# Patient Record
Sex: MALE | Race: WHITE | Employment: PART TIME | URBAN - METROPOLITAN AREA
[De-identification: names, ages, dates, MRNs, and addresses within clinical notes are randomized per-mention and may not be internally consistent; named-entity substitution may affect disease eponyms.]

---

## 2017-02-10 ENCOUNTER — OFFICE VISIT (OUTPATIENT)
Dept: INTERNAL MEDICINE CLINIC | Facility: CLINIC | Age: 42
End: 2017-02-10

## 2017-02-10 VITALS
SYSTOLIC BLOOD PRESSURE: 117 MMHG | HEIGHT: 70 IN | HEART RATE: 73 BPM | RESPIRATION RATE: 12 BRPM | WEIGHT: 224 LBS | TEMPERATURE: 97 F | DIASTOLIC BLOOD PRESSURE: 72 MMHG | BODY MASS INDEX: 32.07 KG/M2

## 2017-02-10 DIAGNOSIS — J06.9 VIRAL UPPER RESPIRATORY TRACT INFECTION: ICD-10-CM

## 2017-02-10 DIAGNOSIS — K11.8 MASS OF SALIVARY GLAND: Primary | ICD-10-CM

## 2017-02-10 PROCEDURE — 99202 OFFICE O/P NEW SF 15 MIN: CPT | Performed by: INTERNAL MEDICINE

## 2017-02-10 PROCEDURE — 99212 OFFICE O/P EST SF 10 MIN: CPT | Performed by: INTERNAL MEDICINE

## 2017-02-10 RX ORDER — AMOXICILLIN 500 MG/1
1 CAPSULE ORAL 3 TIMES DAILY
Refills: 0 | COMMUNITY
Start: 2017-02-03 | End: 2017-02-17 | Stop reason: ALTCHOICE

## 2017-02-10 NOTE — PROGRESS NOTES
HPI:    Patient ID: Oscar Wu is a 46year old male. Mass  This is a new problem. The current episode started more than 1 month ago. The problem occurs constantly. The problem has been gradually worsening. Associated symptoms include congestion.  Pert Musculoskeletal: He exhibits no edema. Lymphadenopathy:        Head (right side): No submental, no submandibular, no preauricular, no posterior auricular and no occipital adenopathy present.         Head (left side): No submental, no submandibular, no p

## 2017-02-17 ENCOUNTER — OFFICE VISIT (OUTPATIENT)
Dept: OTOLARYNGOLOGY | Facility: CLINIC | Age: 42
End: 2017-02-17

## 2017-02-17 VITALS
DIASTOLIC BLOOD PRESSURE: 72 MMHG | SYSTOLIC BLOOD PRESSURE: 114 MMHG | HEIGHT: 70 IN | HEART RATE: 72 BPM | BODY MASS INDEX: 30.78 KG/M2 | WEIGHT: 215 LBS

## 2017-02-17 DIAGNOSIS — R22.1 NECK MASS: Primary | ICD-10-CM

## 2017-02-17 PROCEDURE — 99212 OFFICE O/P EST SF 10 MIN: CPT | Performed by: OTOLARYNGOLOGY

## 2017-02-17 PROCEDURE — 99243 OFF/OP CNSLTJ NEW/EST LOW 30: CPT | Performed by: OTOLARYNGOLOGY

## 2017-02-17 RX ORDER — ARIPIPRAZOLE 10 MG/1
10 TABLET ORAL DAILY
COMMUNITY

## 2017-02-17 NOTE — PROGRESS NOTES
Mika Johnson is a 46year old male. Patient presents with:  Mass: right side of neck , per pt no pain or tendersness , pt prescribed amoxicillin by oral surgeon     HPI:   He has had a lump in the right side of his neck for the last 2 months. It seems to be Submental. Submandibular. Anterior cervical. Posterior cervical. Supraclavicular.    Eyes Normal Conjunctiva - Right: Normal, Left: Normal. Pupil - Right: Normal, Left: Normal.    Ears Normal Inspection - Right: Normal, Left: Normal. Canal - Left: Normal. T

## 2017-02-20 ENCOUNTER — TELEPHONE (OUTPATIENT)
Dept: OTOLARYNGOLOGY | Facility: CLINIC | Age: 42
End: 2017-02-20

## 2017-02-20 NOTE — TELEPHONE ENCOUNTER
Called pt's insurance [phone], spoke with Jami Bishop, case is in clinical review, tracking number [id number].

## 2017-02-21 NOTE — TELEPHONE ENCOUNTER
Gave his mom the auth# and then transferred her to central scheduling to schedule CT soft tissue neck

## 2017-02-21 NOTE — TELEPHONE ENCOUNTER
Left message for pt to call back to inform that he can schedule CT soft tissue of neck at this time, authorization number [id number], valid 2-20-17 thru 3-22-17 to be done at [facility]. It is the patient's responsibility to verify benefit

## 2017-02-25 ENCOUNTER — HOSPITAL ENCOUNTER (OUTPATIENT)
Dept: CT IMAGING | Facility: HOSPITAL | Age: 42
Discharge: HOME | End: 2017-02-25
Attending: OTOLARYNGOLOGY
Payer: COMMERCIAL

## 2017-02-25 DIAGNOSIS — R22.1 NECK MASS: ICD-10-CM

## 2017-02-25 PROCEDURE — 70491 CT SOFT TISSUE NECK W/DYE: CPT

## 2017-02-28 ENCOUNTER — TELEPHONE (OUTPATIENT)
Dept: OTOLARYNGOLOGY | Facility: CLINIC | Age: 42
End: 2017-02-28

## 2017-02-28 NOTE — TELEPHONE ENCOUNTER
Pt informed of results and recommendation to RTC to discuss treatment. Pt verbalized understanding; scheduled f/u appt w/ .

## 2017-03-06 ENCOUNTER — OFFICE VISIT (OUTPATIENT)
Dept: OTOLARYNGOLOGY | Facility: CLINIC | Age: 42
End: 2017-03-06

## 2017-03-06 VITALS
WEIGHT: 215 LBS | HEIGHT: 70 IN | TEMPERATURE: 98 F | BODY MASS INDEX: 30.78 KG/M2 | SYSTOLIC BLOOD PRESSURE: 121 MMHG | DIASTOLIC BLOOD PRESSURE: 77 MMHG

## 2017-03-06 DIAGNOSIS — R22.1 NECK MASS: Primary | ICD-10-CM

## 2017-03-06 PROCEDURE — 99212 OFFICE O/P EST SF 10 MIN: CPT | Performed by: OTOLARYNGOLOGY

## 2017-03-06 PROCEDURE — 99213 OFFICE O/P EST LOW 20 MIN: CPT | Performed by: OTOLARYNGOLOGY

## 2017-03-06 NOTE — PROGRESS NOTES
Jacobo Watkins is a 46year old male. Patient presents with:  Results: CT soft tissue of neck done on 2-25    HPI:   Pt is in follow up of a CT of his neck. I reviewed the CT myself and the findings with him.  CT shows a large cystic mass involving the right Lymph Detail Normal Submental. Submandibular. Anterior cervical. Posterior cervical. Supraclavicular.    Eyes Normal Conjunctiva - Right: Normal, Left: Normal. Pupil - Right: Normal, Left: Normal.    Ears Normal Inspection - Right: Normal, Left: Normal. C

## 2017-03-07 ENCOUNTER — TELEPHONE (OUTPATIENT)
Dept: OTOLARYNGOLOGY | Facility: CLINIC | Age: 42
End: 2017-03-07

## 2017-03-09 ENCOUNTER — TELEPHONE (OUTPATIENT)
Dept: OTOLARYNGOLOGY | Facility: CLINIC | Age: 42
End: 2017-03-09

## 2017-03-09 NOTE — TELEPHONE ENCOUNTER
Pts mother requesting VIET form to be mailed to home address, address on [facility] confirmed.

## 2017-03-09 NOTE — TELEPHONE ENCOUNTER
Pt contacted, Scheduled surgery with mother with pt permission, Scheduled surgery for 4/3/17 with Dr. César Solitario. Pre-post op instructions reviewed.

## 2017-03-28 ENCOUNTER — TELEPHONE (OUTPATIENT)
Dept: OTOLARYNGOLOGY | Facility: CLINIC | Age: 42
End: 2017-03-28

## 2017-03-28 NOTE — TELEPHONE ENCOUNTER
Pt scheduled for surgery 4/3/17 w/  (excision R neck mass). Pt's mom would like to know if pt may continue Sertraline and generic Abilify until then. Please advise.

## 2017-03-28 NOTE — TELEPHONE ENCOUNTER
pts Mom called. pt is to have surgery 4/3/17. She asked if Unknown Press can continue taking Sertraline and generic Abilify. Ok to leave message on voice mail. Thank you.

## 2017-04-03 ENCOUNTER — HOSPITAL ENCOUNTER (OUTPATIENT)
Facility: HOSPITAL | Age: 42
Setting detail: HOSPITAL OUTPATIENT SURGERY
Discharge: HOME | End: 2017-04-03
Attending: OTOLARYNGOLOGY | Admitting: OTOLARYNGOLOGY
Payer: COMMERCIAL

## 2017-04-03 ENCOUNTER — ANESTHESIA (OUTPATIENT)
Dept: SURGERY | Facility: HOSPITAL | Age: 42
End: 2017-04-03
Payer: COMMERCIAL

## 2017-04-03 ENCOUNTER — ANESTHESIA EVENT (OUTPATIENT)
Dept: SURGERY | Facility: HOSPITAL | Age: 42
End: 2017-04-03
Payer: COMMERCIAL

## 2017-04-03 ENCOUNTER — SURGERY (OUTPATIENT)
Age: 42
End: 2017-04-03

## 2017-04-03 VITALS
WEIGHT: 223 LBS | DIASTOLIC BLOOD PRESSURE: 68 MMHG | RESPIRATION RATE: 18 BRPM | HEART RATE: 89 BPM | TEMPERATURE: 98 F | HEIGHT: 70 IN | OXYGEN SATURATION: 95 % | SYSTOLIC BLOOD PRESSURE: 110 MMHG | BODY MASS INDEX: 31.92 KG/M2

## 2017-04-03 DIAGNOSIS — R22.1 NECK MASS: ICD-10-CM

## 2017-04-03 PROCEDURE — 88307 TISSUE EXAM BY PATHOLOGIST: CPT | Performed by: OTOLARYNGOLOGY

## 2017-04-03 PROCEDURE — 88304 TISSUE EXAM BY PATHOLOGIST: CPT | Performed by: OTOLARYNGOLOGY

## 2017-04-03 PROCEDURE — 88305 TISSUE EXAM BY PATHOLOGIST: CPT | Performed by: OTOLARYNGOLOGY

## 2017-04-03 PROCEDURE — 95867 NDL EMG CRANIAL NRV MUSC UNI: CPT | Performed by: OTOLARYNGOLOGY

## 2017-04-03 PROCEDURE — 0WB60ZZ EXCISION OF NECK, OPEN APPROACH: ICD-10-PCS | Performed by: OTOLARYNGOLOGY

## 2017-04-03 RX ORDER — NALOXONE HYDROCHLORIDE 0.4 MG/ML
80 INJECTION, SOLUTION INTRAMUSCULAR; INTRAVENOUS; SUBCUTANEOUS AS NEEDED
Status: DISCONTINUED | OUTPATIENT
Start: 2017-04-03 | End: 2017-04-03

## 2017-04-03 RX ORDER — DEXAMETHASONE SODIUM PHOSPHATE 4 MG/ML
VIAL (ML) INJECTION AS NEEDED
Status: DISCONTINUED | OUTPATIENT
Start: 2017-04-03 | End: 2017-04-03 | Stop reason: SURG

## 2017-04-03 RX ORDER — ONDANSETRON 2 MG/ML
4 INJECTION INTRAMUSCULAR; INTRAVENOUS ONCE AS NEEDED
Status: COMPLETED | OUTPATIENT
Start: 2017-04-03 | End: 2017-04-03

## 2017-04-03 RX ORDER — SUCCINYLCHOLINE CHLORIDE 20 MG/ML
INJECTION INTRAMUSCULAR; INTRAVENOUS AS NEEDED
Status: DISCONTINUED | OUTPATIENT
Start: 2017-04-03 | End: 2017-04-03 | Stop reason: SURG

## 2017-04-03 RX ORDER — HYDROMORPHONE HYDROCHLORIDE 1 MG/ML
0.2 INJECTION, SOLUTION INTRAMUSCULAR; INTRAVENOUS; SUBCUTANEOUS EVERY 5 MIN PRN
Status: DISCONTINUED | OUTPATIENT
Start: 2017-04-03 | End: 2017-04-03

## 2017-04-03 RX ORDER — ONDANSETRON 2 MG/ML
4 INJECTION INTRAMUSCULAR; INTRAVENOUS EVERY 6 HOURS PRN
Status: DISCONTINUED | OUTPATIENT
Start: 2017-04-03 | End: 2017-04-03

## 2017-04-03 RX ORDER — ONDANSETRON 4 MG/1
4 TABLET, ORALLY DISINTEGRATING ORAL EVERY 6 HOURS PRN
Status: DISCONTINUED | OUTPATIENT
Start: 2017-04-03 | End: 2017-04-03

## 2017-04-03 RX ORDER — HYDROCODONE BITARTRATE AND ACETAMINOPHEN 5; 325 MG/1; MG/1
1 TABLET ORAL EVERY 4 HOURS PRN
Status: DISCONTINUED | OUTPATIENT
Start: 2017-04-03 | End: 2017-04-03

## 2017-04-03 RX ORDER — LIDOCAINE HYDROCHLORIDE 40 MG/ML
SOLUTION TOPICAL AS NEEDED
Status: DISCONTINUED | OUTPATIENT
Start: 2017-04-03 | End: 2017-04-03 | Stop reason: SURG

## 2017-04-03 RX ORDER — HYDROMORPHONE HYDROCHLORIDE 1 MG/ML
0.4 INJECTION, SOLUTION INTRAMUSCULAR; INTRAVENOUS; SUBCUTANEOUS EVERY 5 MIN PRN
Status: DISCONTINUED | OUTPATIENT
Start: 2017-04-03 | End: 2017-04-03

## 2017-04-03 RX ORDER — MORPHINE SULFATE 4 MG/ML
4 INJECTION, SOLUTION INTRAMUSCULAR; INTRAVENOUS EVERY 10 MIN PRN
Status: DISCONTINUED | OUTPATIENT
Start: 2017-04-03 | End: 2017-04-03

## 2017-04-03 RX ORDER — CEPHALEXIN 500 MG/1
500 CAPSULE ORAL EVERY 8 HOURS
Qty: 15 CAPSULE | Refills: 0 | Status: SHIPPED | OUTPATIENT
Start: 2017-04-03

## 2017-04-03 RX ORDER — HYDROCODONE BITARTRATE AND ACETAMINOPHEN 5; 325 MG/1; MG/1
TABLET ORAL
Qty: 30 TABLET | Refills: 0 | Status: SHIPPED | OUTPATIENT
Start: 2017-04-03

## 2017-04-03 RX ORDER — FAMOTIDINE 20 MG/1
20 TABLET ORAL ONCE
Status: COMPLETED | OUTPATIENT
Start: 2017-04-03 | End: 2017-04-03

## 2017-04-03 RX ORDER — SODIUM CHLORIDE, SODIUM LACTATE, POTASSIUM CHLORIDE, CALCIUM CHLORIDE 600; 310; 30; 20 MG/100ML; MG/100ML; MG/100ML; MG/100ML
INJECTION, SOLUTION INTRAVENOUS CONTINUOUS
Status: DISCONTINUED | OUTPATIENT
Start: 2017-04-03 | End: 2017-04-03

## 2017-04-03 RX ORDER — METOCLOPRAMIDE 10 MG/1
10 TABLET ORAL ONCE
Status: DISCONTINUED | OUTPATIENT
Start: 2017-04-03 | End: 2017-04-03

## 2017-04-03 RX ORDER — MIDAZOLAM HYDROCHLORIDE 1 MG/ML
INJECTION INTRAMUSCULAR; INTRAVENOUS AS NEEDED
Status: DISCONTINUED | OUTPATIENT
Start: 2017-04-03 | End: 2017-04-03 | Stop reason: SURG

## 2017-04-03 RX ORDER — HYDROMORPHONE HYDROCHLORIDE 1 MG/ML
0.6 INJECTION, SOLUTION INTRAMUSCULAR; INTRAVENOUS; SUBCUTANEOUS EVERY 5 MIN PRN
Status: DISCONTINUED | OUTPATIENT
Start: 2017-04-03 | End: 2017-04-03

## 2017-04-03 RX ORDER — GLYCOPYRROLATE 0.2 MG/ML
INJECTION INTRAMUSCULAR; INTRAVENOUS AS NEEDED
Status: DISCONTINUED | OUTPATIENT
Start: 2017-04-03 | End: 2017-04-03 | Stop reason: SURG

## 2017-04-03 RX ORDER — EPHEDRINE SULFATE 50 MG/ML
INJECTION, SOLUTION INTRAVENOUS AS NEEDED
Status: DISCONTINUED | OUTPATIENT
Start: 2017-04-03 | End: 2017-04-03 | Stop reason: SURG

## 2017-04-03 RX ORDER — HYDROCODONE BITARTRATE AND ACETAMINOPHEN 5; 325 MG/1; MG/1
1 TABLET ORAL AS NEEDED
Status: DISCONTINUED | OUTPATIENT
Start: 2017-04-03 | End: 2017-04-03

## 2017-04-03 RX ORDER — LIDOCAINE HYDROCHLORIDE 10 MG/ML
INJECTION, SOLUTION EPIDURAL; INFILTRATION; INTRACAUDAL; PERINEURAL AS NEEDED
Status: DISCONTINUED | OUTPATIENT
Start: 2017-04-03 | End: 2017-04-03 | Stop reason: SURG

## 2017-04-03 RX ORDER — HYDROCODONE BITARTRATE AND ACETAMINOPHEN 5; 325 MG/1; MG/1
2 TABLET ORAL AS NEEDED
Status: DISCONTINUED | OUTPATIENT
Start: 2017-04-03 | End: 2017-04-03

## 2017-04-03 RX ORDER — LIDOCAINE HYDROCHLORIDE AND EPINEPHRINE 10; 10 MG/ML; UG/ML
INJECTION, SOLUTION INFILTRATION; PERINEURAL AS NEEDED
Status: DISCONTINUED | OUTPATIENT
Start: 2017-04-03 | End: 2017-04-03 | Stop reason: HOSPADM

## 2017-04-03 RX ORDER — ACETAMINOPHEN 325 MG/1
650 TABLET ORAL ONCE
Status: COMPLETED | OUTPATIENT
Start: 2017-04-03 | End: 2017-04-03

## 2017-04-03 RX ORDER — MORPHINE SULFATE 2 MG/ML
2 INJECTION, SOLUTION INTRAMUSCULAR; INTRAVENOUS EVERY 10 MIN PRN
Status: DISCONTINUED | OUTPATIENT
Start: 2017-04-03 | End: 2017-04-03

## 2017-04-03 RX ORDER — MORPHINE SULFATE 10 MG/ML
6 INJECTION, SOLUTION INTRAMUSCULAR; INTRAVENOUS EVERY 10 MIN PRN
Status: DISCONTINUED | OUTPATIENT
Start: 2017-04-03 | End: 2017-04-03

## 2017-04-03 RX ORDER — ROCURONIUM BROMIDE 10 MG/ML
INJECTION, SOLUTION INTRAVENOUS AS NEEDED
Status: DISCONTINUED | OUTPATIENT
Start: 2017-04-03 | End: 2017-04-03 | Stop reason: SURG

## 2017-04-03 RX ADMIN — ONDANSETRON 4 MG: 2 INJECTION INTRAMUSCULAR; INTRAVENOUS at 08:21:00

## 2017-04-03 RX ADMIN — LIDOCAINE HYDROCHLORIDE 4 ML: 40 SOLUTION TOPICAL at 07:15:00

## 2017-04-03 RX ADMIN — DEXAMETHASONE SODIUM PHOSPHATE 4 MG: 4 MG/ML VIAL (ML) INJECTION at 07:25:00

## 2017-04-03 RX ADMIN — GLYCOPYRROLATE 0.2 MG: 0.2 INJECTION INTRAMUSCULAR; INTRAVENOUS at 07:14:00

## 2017-04-03 RX ADMIN — EPHEDRINE SULFATE 5 MG: 50 INJECTION, SOLUTION INTRAVENOUS at 07:42:00

## 2017-04-03 RX ADMIN — SODIUM CHLORIDE, SODIUM LACTATE, POTASSIUM CHLORIDE, CALCIUM CHLORIDE: 600; 310; 30; 20 INJECTION, SOLUTION INTRAVENOUS at 07:13:00

## 2017-04-03 RX ADMIN — LIDOCAINE HYDROCHLORIDE 50 MG: 10 INJECTION, SOLUTION EPIDURAL; INFILTRATION; INTRACAUDAL; PERINEURAL at 07:14:00

## 2017-04-03 RX ADMIN — SUCCINYLCHOLINE CHLORIDE 160 MG: 20 INJECTION INTRAMUSCULAR; INTRAVENOUS at 07:14:00

## 2017-04-03 RX ADMIN — EPHEDRINE SULFATE 5 MG: 50 INJECTION, SOLUTION INTRAVENOUS at 07:45:00

## 2017-04-03 RX ADMIN — SODIUM CHLORIDE, SODIUM LACTATE, POTASSIUM CHLORIDE, CALCIUM CHLORIDE: 600; 310; 30; 20 INJECTION, SOLUTION INTRAVENOUS at 08:27:00

## 2017-04-03 RX ADMIN — MIDAZOLAM HYDROCHLORIDE 2 MG: 1 INJECTION INTRAMUSCULAR; INTRAVENOUS at 07:13:00

## 2017-04-03 RX ADMIN — ROCURONIUM BROMIDE 5 MG: 10 INJECTION, SOLUTION INTRAVENOUS at 07:14:00

## 2017-04-03 NOTE — OPERATIVE REPORT
[facility]    PATIENT'S NAME: Nelli Bruno   ATTENDING PHYSICIAN: Hari Rubi MD   OPERATING PHYSICIAN: Hari Rubi MD   PATIENT ACCOUNT#:   [id number]    LOCATION:  [facility] [address] [facility] 10  MEDICAL RECORD #:   [id number]       DATE OF BIR in layers approximating the platysma and subcutaneous tissues with chromic sutures, and the skin was closed with 4-0 Prolene suture in a running subcuticular fashion. The wound was dressed with Mastisol, Steri-Strips, and a pressure dressing.   The patient

## 2017-04-03 NOTE — ANESTHESIA POSTPROCEDURE EVALUATION
Patient: Gabbie Jay    Procedure Summary     Date Anesthesia Start Anesthesia Stop Room / Location    04/03/17 0713 0831 [facility] MAIN OR 02 / [facility] MAIN OR       Procedure Diagnosis Surgeon Responsible Provider    EXCISION OF LESION/LIPOMA (Right Neck) Neck ma

## 2017-04-03 NOTE — BRIEF OP NOTE
[facility] PRE OP RECOVERY  Brief Op Note     [facility] Location: OR   [facility] [id number] MRN [id number]   Admission Date 4/3/2017 Operation Date 4/3/2017   Attending Physician Kalin Torres MD Operating Physician Rox Barber.  Ashley Pruitt MD       Pre-Operat

## 2017-04-03 NOTE — ANESTHESIA PREPROCEDURE EVALUATION
Anesthesia PreOp Note    HPI:     Misha Jordan is a 46year old male who presents for preoperative consultation requested by: Larry Zimmer MD    Date of Surgery: 4/3/2017    Procedure(s):  EXCISION OF LESION/LIPOMA  Indication: Neck mass [R22.1]    Pre- NICA, CRNA 160 mg at 04/03/17 0714   Lidocaine HCl (LARYNG-O-SET) 4 % solution  Endotracheal PRN Rip Gomez CRNA 4 mL at 04/03/17 0715   glycopyrrolate (ROBINUL) 0.2 MG/ML injection  Intravenous PRN Rip Gomez CRNA 0.2 mg at 04/03/ Endo/Other - negative ROS   Abdominal  - normal exam             Anesthesia Plan:   ASA:  2  Plan:   General  Airway:  ETT  Informed Consent Plan and Risks Discussed With:  Patient, father and mother  Discussed plan with:  CRNA      I have informed Erica

## 2017-04-03 NOTE — H&P
Kip Agudelo MD at 3/6/2017  1:07 PM      Status: Signed : Kip Agudelo MD (Physician)     Expand All Collapse All    Remonia Pi is a 46year old male.  Patient presents with:  Results: CT soft tissue of neck done on 2-25    HPI:    Pt is in f - Normal.  Cystic mass approx 7 cm right upper neck, ? Parotid involvement    Psychiatric  Normal  Orientation - Oriented to time, place, person & situation. Appropriate mood and affect.    Lymph Detail  Normal  Submental. Submandibular.  Anterior cervical.

## 2017-04-03 NOTE — INTERVAL H&P NOTE
Pre-op Diagnosis: Neck mass [R22.1]    The above referenced H&P was reviewed by Bonita Morrison. Jennifer Falcon MD on 4/3/2017, the patient was examined and no significant changes have occurred in the patient's condition since the H&P was performed.   I discussed with the

## 2017-04-04 ENCOUNTER — TELEPHONE (OUTPATIENT)
Dept: OTOLARYNGOLOGY | Facility: CLINIC | Age: 42
End: 2017-04-04

## 2017-04-04 NOTE — TELEPHONE ENCOUNTER
Pt. States that he has a drain on his neck, and that he is supposed to call in to report how much drainage he has. Pt. States that he had 25cc's yesterday of drainage and 10cc's so far today. Pt. Wants to know when should he come in for his post-op visit.

## 2017-04-04 NOTE — TELEPHONE ENCOUNTER
Per pt is doing well, monitoring drain output. Advised pt that drain can be removed once drain output is less than 30 ml within 24 hours.  Per pt drain output yesterday at 04/03 at 3:50 pm was 15ml, at 10:30 pm 04/03 drain output was 10 ml, and this morning

## 2017-04-05 NOTE — TELEPHONE ENCOUNTER
Per pt, drain output  @ 6:30 pm 10 mL, output  @ 5:30 am 7 mL; output is \"pinkish red\". No c/o pain, discomfort at this time. Scheduled pt for drain removal  w/ .

## 2017-04-06 ENCOUNTER — OFFICE VISIT (OUTPATIENT)
Dept: OTOLARYNGOLOGY | Facility: CLINIC | Age: 42
End: 2017-04-06

## 2017-04-06 VITALS
SYSTOLIC BLOOD PRESSURE: 115 MMHG | TEMPERATURE: 97 F | WEIGHT: 220 LBS | HEIGHT: 70 IN | BODY MASS INDEX: 31.5 KG/M2 | DIASTOLIC BLOOD PRESSURE: 76 MMHG

## 2017-04-06 DIAGNOSIS — R22.1 NECK MASS: Primary | ICD-10-CM

## 2017-04-06 PROCEDURE — 99212 OFFICE O/P EST SF 10 MIN: CPT | Performed by: OTOLARYNGOLOGY

## 2017-04-06 PROCEDURE — 99024 POSTOP FOLLOW-UP VISIT: CPT | Performed by: OTOLARYNGOLOGY

## 2017-04-07 NOTE — PROGRESS NOTES
She is in follow up of a large cystic mass excision. 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    Suture line healing nicely. Drain removed today.     Assessment/plan:  Doing well following excision of right neck cystic and drain removal. Follow up

## 2017-04-10 ENCOUNTER — OFFICE VISIT (OUTPATIENT)
Dept: OTOLARYNGOLOGY | Facility: CLINIC | Age: 42
End: 2017-04-10

## 2017-04-10 VITALS — SYSTOLIC BLOOD PRESSURE: 116 MMHG | DIASTOLIC BLOOD PRESSURE: 70 MMHG | TEMPERATURE: 98 F

## 2017-04-10 DIAGNOSIS — R22.1 NECK MASS: Primary | ICD-10-CM

## 2017-04-10 PROCEDURE — 99212 OFFICE O/P EST SF 10 MIN: CPT | Performed by: OTOLARYNGOLOGY

## 2017-04-10 PROCEDURE — 99024 POSTOP FOLLOW-UP VISIT: CPT | Performed by: OTOLARYNGOLOGY

## 2017-04-11 NOTE — PROGRESS NOTES
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    Exam:  Suture line is healing well. Sutures removed today. Assessment/plan:  Doing very well following excision of right complex cyst. Return as needed.

## 2017-08-24 ENCOUNTER — OFFICE VISIT (OUTPATIENT)
Dept: INTERNAL MEDICINE CLINIC | Facility: CLINIC | Age: 42
End: 2017-08-24

## 2017-08-24 ENCOUNTER — APPOINTMENT (OUTPATIENT)
Dept: CT IMAGING | Facility: HOSPITAL | Age: 42
End: 2017-08-24
Attending: EMERGENCY MEDICINE
Payer: COMMERCIAL

## 2017-08-24 ENCOUNTER — HOSPITAL ENCOUNTER (EMERGENCY)
Facility: HOSPITAL | Age: 42
Discharge: HOME | End: 2017-08-24
Attending: EMERGENCY MEDICINE
Payer: COMMERCIAL

## 2017-08-24 VITALS
SYSTOLIC BLOOD PRESSURE: 110 MMHG | HEART RATE: 64 BPM | BODY MASS INDEX: 30.06 KG/M2 | TEMPERATURE: 98 F | DIASTOLIC BLOOD PRESSURE: 70 MMHG | WEIGHT: 210 LBS | HEIGHT: 70 IN

## 2017-08-24 VITALS
SYSTOLIC BLOOD PRESSURE: 101 MMHG | HEIGHT: 70 IN | DIASTOLIC BLOOD PRESSURE: 66 MMHG | RESPIRATION RATE: 18 BRPM | HEART RATE: 54 BPM | TEMPERATURE: 98 F | WEIGHT: 210 LBS | BODY MASS INDEX: 30.06 KG/M2 | OXYGEN SATURATION: 94 %

## 2017-08-24 DIAGNOSIS — K63.89 EPIPLOIC APPENDAGITIS: Primary | ICD-10-CM

## 2017-08-24 DIAGNOSIS — R10.32 LLQ ABDOMINAL PAIN: Primary | ICD-10-CM

## 2017-08-24 LAB
ANION GAP SERPL CALC-SCNC: 10 MMOL/L (ref 0–18)
BASOPHILS # BLD: 0.1 K/UL (ref 0–0.2)
BASOPHILS NFR BLD: 1 %
BILIRUB UR QL: NEGATIVE
BUN SERPL-MCNC: 16 MG/DL (ref 8–20)
BUN/CREAT SERPL: 13.1 (ref 10–20)
CALCIUM SERPL-MCNC: 8.5 MG/DL (ref 8.5–10.5)
CHLORIDE SERPL-SCNC: 101 MMOL/L (ref 95–110)
CO2 SERPL-SCNC: 23 MMOL/L (ref 22–32)
COLOR UR: YELLOW
CREAT SERPL-MCNC: 1.22 MG/DL (ref 0.5–1.5)
EOSINOPHIL # BLD: 0.2 K/UL (ref 0–0.7)
EOSINOPHIL NFR BLD: 3 %
ERYTHROCYTE [DISTWIDTH] IN BLOOD BY AUTOMATED COUNT: 13 % (ref 11–15)
GLUCOSE SERPL-MCNC: 98 MG/DL (ref 70–99)
GLUCOSE UR-MCNC: NEGATIVE MG/DL
HCT VFR BLD AUTO: 43.1 % (ref 41–52)
HGB BLD-MCNC: 15 G/DL (ref 13.5–17.5)
HGB UR QL STRIP.AUTO: NEGATIVE
KETONES UR-MCNC: NEGATIVE MG/DL
LEUKOCYTE ESTERASE UR QL STRIP.AUTO: NEGATIVE
LYMPHOCYTES # BLD: 2.1 K/UL (ref 1–4)
LYMPHOCYTES NFR BLD: 21 %
MCH RBC QN AUTO: 30.7 PG (ref 27–32)
MCHC RBC AUTO-ENTMCNC: 34.8 G/DL (ref 32–37)
MCV RBC AUTO: 88.2 FL (ref 80–100)
MONOCYTES # BLD: 1 K/UL (ref 0–1)
MONOCYTES NFR BLD: 10 %
NEUTROPHILS # BLD AUTO: 6.5 K/UL (ref 1.8–7.7)
NEUTROPHILS NFR BLD: 66 %
NITRITE UR QL STRIP.AUTO: NEGATIVE
OSMOLALITY UR CALC.SUM OF ELEC: 279 MOSM/KG (ref 275–295)
PH UR: 5 [PH] (ref 5–8)
PLATELET # BLD AUTO: 319 K/UL (ref 140–400)
PMV BLD AUTO: 7.2 FL (ref 7.4–10.3)
POTASSIUM SERPL-SCNC: 3.4 MMOL/L (ref 3.3–5.1)
PROT UR-MCNC: NEGATIVE MG/DL
RBC # BLD AUTO: 4.89 M/UL (ref 4.5–5.9)
SODIUM SERPL-SCNC: 134 MMOL/L (ref 136–144)
SP GR UR STRIP: 1.03 (ref 1–1.03)
UROBILINOGEN UR STRIP-ACNC: <2
VIT C UR-MCNC: NEGATIVE MG/DL
WBC # BLD AUTO: 9.8 K/UL (ref 4–11)

## 2017-08-24 PROCEDURE — 99284 EMERGENCY DEPT VISIT MOD MDM: CPT

## 2017-08-24 PROCEDURE — 96374 THER/PROPH/DIAG INJ IV PUSH: CPT

## 2017-08-24 PROCEDURE — 96361 HYDRATE IV INFUSION ADD-ON: CPT

## 2017-08-24 PROCEDURE — 74177 CT ABD & PELVIS W/CONTRAST: CPT | Performed by: EMERGENCY MEDICINE

## 2017-08-24 PROCEDURE — 80048 BASIC METABOLIC PNL TOTAL CA: CPT | Performed by: EMERGENCY MEDICINE

## 2017-08-24 PROCEDURE — 99212 OFFICE O/P EST SF 10 MIN: CPT | Performed by: INTERNAL MEDICINE

## 2017-08-24 PROCEDURE — 99214 OFFICE O/P EST MOD 30 MIN: CPT | Performed by: INTERNAL MEDICINE

## 2017-08-24 PROCEDURE — 81003 URINALYSIS AUTO W/O SCOPE: CPT | Performed by: EMERGENCY MEDICINE

## 2017-08-24 PROCEDURE — 85025 COMPLETE CBC W/AUTO DIFF WBC: CPT | Performed by: EMERGENCY MEDICINE

## 2017-08-24 RX ORDER — TRAMADOL HYDROCHLORIDE 50 MG/1
TABLET ORAL EVERY 4 HOURS PRN
Qty: 15 TABLET | Refills: 0 | Status: SHIPPED | OUTPATIENT
Start: 2017-08-24

## 2017-08-24 RX ORDER — HYDROMORPHONE HYDROCHLORIDE 1 MG/ML
0.5 INJECTION, SOLUTION INTRAMUSCULAR; INTRAVENOUS; SUBCUTANEOUS ONCE
Status: COMPLETED | OUTPATIENT
Start: 2017-08-24 | End: 2017-08-24

## 2017-08-24 NOTE — PROGRESS NOTES
HPI:    Patient ID: Iain Winchester is a 46year old male. Abdominal Pain   This is a new problem. The current episode started in the past 7 days (2 days). The onset quality is sudden. The problem occurs constantly.  The problem has been gradually worsening appear ill. No distress. HENT:   Right Ear: External ear normal.   Left Ear: External ear normal.   Nose: Nose normal.   Mouth/Throat: Oropharynx is clear and moist. No oropharyngeal exudate.    Eyes: Conjunctivae are normal. Right eye exhibits no dischar This Visit:  No prescriptions requested or ordered in this encounter    Imaging & Referrals:  None       CQ#[id number]

## 2017-08-24 NOTE — ED PROVIDER NOTES
Patient Seen in: [facility] AND [facility]    History   Patient presents with:  Abdomen/Flank Pain (GI/)    Stated Complaint: llq abdominal pain x 2 days.     HPI    29-year-old male with history of anxiety and depression presents with comp above.    PSFH elements reviewed from today and agreed except as otherwise stated in HPI.     Physical Exam   ED Triage Vitals [08/24/17 0908]  BP: 116/66  Pulse: 68  Resp: 20  Temp: 98 °F (36.7 °C)  Temp src: Temporal  SpO2: 96 %  O2 Device: None (Room air -----------         ------                     CBC W/ DIFFERENTIAL[[id number]]          Abnormal            Final result                 Please view results for these tests on the individual orders.    RAINBOW DRAW BLUE   RAINBOW DRAW GOLD

## 2017-08-25 ENCOUNTER — TELEPHONE (OUTPATIENT)
Dept: OTHER | Age: 42
End: 2017-08-25

## 2017-08-25 NOTE — TELEPHONE ENCOUNTER
Pt was seen in ER yesterday for lower abdominal pain and discharged with rx for Tramadol. Pt states the medication is not controlling his pain at all and would like to know if EL will prescribe something stronger. Please advise.

## 2017-08-25 NOTE — TELEPHONE ENCOUNTER
Pt states the Tylenol #3 will not relieve his pain, he is asking for Norco prescription. Informed him doctor is out of office at this time, he would like a call back tomorrow when doctor is in office. Please advise.

## 2017-08-29 NOTE — TELEPHONE ENCOUNTER
Attempted to call pt for update on pain control, no answer on cell and VM box \"full\"  Home number is invalid. Will attempt again later.

## 2017-08-29 NOTE — TELEPHONE ENCOUNTER
8/29/17 Atempted to call patient to let him know that we can call in pain  Medication, Mail box  For phones listed are full, attempting to find out  Which pharmacy he prefers to have Rx called to.

## 2017-08-30 NOTE — TELEPHONE ENCOUNTER
Mailbox is full. I just wanted to f/u with pt. Thanks  I will send a no response letter to the address on file.

## 2017-09-05 ENCOUNTER — OFFICE VISIT (OUTPATIENT)
Dept: INTERNAL MEDICINE CLINIC | Facility: CLINIC | Age: 42
End: 2017-09-05

## 2017-09-05 VITALS
DIASTOLIC BLOOD PRESSURE: 72 MMHG | TEMPERATURE: 99 F | RESPIRATION RATE: 12 BRPM | SYSTOLIC BLOOD PRESSURE: 110 MMHG | HEART RATE: 69 BPM | WEIGHT: 210 LBS | HEIGHT: 70 IN | BODY MASS INDEX: 30.06 KG/M2

## 2017-09-05 DIAGNOSIS — K63.89 EPIPLOIC APPENDAGITIS: Primary | ICD-10-CM

## 2017-09-05 DIAGNOSIS — N28.1 CYST OF RIGHT KIDNEY: ICD-10-CM

## 2017-09-05 DIAGNOSIS — E27.8 ADRENAL NODULE (HCC): ICD-10-CM

## 2017-09-05 PROCEDURE — 99213 OFFICE O/P EST LOW 20 MIN: CPT | Performed by: INTERNAL MEDICINE

## 2017-09-05 PROCEDURE — 99212 OFFICE O/P EST SF 10 MIN: CPT | Performed by: INTERNAL MEDICINE

## 2017-09-05 RX ORDER — SERTRALINE HYDROCHLORIDE 100 MG/1
100 TABLET, FILM COATED ORAL
COMMUNITY

## 2017-09-05 NOTE — PROGRESS NOTES
HPI:    Patient ID: Misha Jordan is a 46year old male. Pt here for post ER ffup. Pt was referred to ER after seen in clinic with severe LLQ abd pain with guarding and tenderness.   Ct scan abd/pelvis done in ER showed appendigitis epiploica and pt was t External ear normal.   Left Ear: External ear normal.   Nose: Nose normal.   Mouth/Throat: Oropharynx is clear and moist.   Eyes: Conjunctivae and EOM are normal. Pupils are equal, round, and reactive to light. Neck: Normal range of motion. Neck supple.

## 2017-09-11 ENCOUNTER — HOSPITAL ENCOUNTER (OUTPATIENT)
Dept: ULTRASOUND IMAGING | Age: 42
Discharge: HOME | End: 2017-09-11
Attending: INTERNAL MEDICINE
Payer: COMMERCIAL

## 2017-09-11 DIAGNOSIS — N28.1 CYST OF RIGHT KIDNEY: ICD-10-CM

## 2017-09-11 PROCEDURE — 76770 US EXAM ABDO BACK WALL COMP: CPT | Performed by: INTERNAL MEDICINE

## 2017-09-15 ENCOUNTER — OFFICE VISIT (OUTPATIENT)
Dept: ENDOCRINOLOGY CLINIC | Facility: CLINIC | Age: 42
End: 2017-09-15

## 2017-09-15 VITALS
BODY MASS INDEX: 30.06 KG/M2 | SYSTOLIC BLOOD PRESSURE: 108 MMHG | DIASTOLIC BLOOD PRESSURE: 71 MMHG | HEART RATE: 57 BPM | HEIGHT: 70 IN | WEIGHT: 210 LBS

## 2017-09-15 DIAGNOSIS — E27.8 ADRENAL NODULE (HCC): Primary | ICD-10-CM

## 2017-09-15 PROBLEM — D35.01 ADENOMA OF RIGHT ADRENAL GLAND: Status: ACTIVE | Noted: 2017-09-15

## 2017-09-15 PROCEDURE — 99212 OFFICE O/P EST SF 10 MIN: CPT | Performed by: INTERNAL MEDICINE

## 2017-09-15 PROCEDURE — 99243 OFF/OP CNSLTJ NEW/EST LOW 30: CPT | Performed by: INTERNAL MEDICINE

## 2017-09-15 RX ORDER — DEXAMETHASONE 1 MG
1 TABLET ORAL ONCE
Qty: 1 TABLET | Refills: 0 | Status: SHIPPED | OUTPATIENT
Start: 2017-09-15 | End: 2017-09-15

## 2017-09-15 NOTE — H&P
New Patient Evaluation - History and Physical    CONSULT - Reason for Visit: Adrenal nodule  Requesting Physician: Brandon Diaz MD    CHIEF COMPLAINT:  Patient presents with:  Adrenal Problem: nodule       HISTORY OF PRESENT ILLNESS:   Arvella Hamper Day Smoker                                                     Packs/day: 0.50      Years: 15.00          Types: Cigarettes       Start date: 2/10/2002    Smokeless tobacco: Never Used                        Alcohol use: No              Drug use:  No soft, non-distended, non-tender  SKIN:  no bruising or bleeding, no rashes and no lesions  EXTREMITIES:normal pulses, no edema      DATA:     Reviewed      ASSESSMENT AND PLAN:    46year old male with a right adrenal adenoma.    Discussed adrenal adenomas

## 2017-09-18 ENCOUNTER — TELEPHONE (OUTPATIENT)
Dept: ENDOCRINOLOGY CLINIC | Facility: CLINIC | Age: 42
End: 2017-09-18

## 2017-09-18 NOTE — TELEPHONE ENCOUNTER
Pt called w/questions about some tests that he needs to have done. Please call. OK to speak to pts mother Wanda Niece.

## 2017-09-18 NOTE — TELEPHONE ENCOUNTER
Contacted [employer] for Prior authorization. Spoke with Ric Garzon. Completed authorization over the phone. Request submitted and case is currently pending. Clinical information needs to be submitted to [phone].    Clinicals faxed today and will diallo

## 2017-09-18 NOTE — TELEPHONE ENCOUNTER
Danita Bailey requesting RN to obtain prior auth for pt's Monday, 10/2/2017 MRI. Pls call. THank you.

## 2017-09-18 NOTE — TELEPHONE ENCOUNTER
Spoke with mother. Clarified how dexamethasone testing should be completed. Discussed scheduling of MRI and how to do that. No further questions at this time.

## 2017-09-19 NOTE — TELEPHONE ENCOUNTER
Contacted mother and let her know authorization for MRI was approved. Authorization number [id number]. Approved through October 18, 2017. Informed her ok to keep MRI as scheduled.

## 2017-09-20 ENCOUNTER — APPOINTMENT (OUTPATIENT)
Dept: LAB | Age: 42
End: 2017-09-20
Attending: INTERNAL MEDICINE
Payer: COMMERCIAL

## 2017-09-20 DIAGNOSIS — E27.8 ADRENAL NODULE (HCC): ICD-10-CM

## 2017-09-20 LAB — CORTIS SERPL-MCNC: 0.8 MCG/DL

## 2017-09-20 PROCEDURE — 82533 TOTAL CORTISOL: CPT

## 2017-09-20 PROCEDURE — 36415 COLL VENOUS BLD VENIPUNCTURE: CPT

## 2017-09-20 PROCEDURE — 83835 ASSAY OF METANEPHRINES: CPT

## 2017-09-22 LAB
METANEPHRINE: 0.12 NMOL/L
NORMETANEPHRINE: 0.27 NMOL/L

## 2017-09-25 ENCOUNTER — TELEPHONE (OUTPATIENT)
Dept: ENDOCRINOLOGY CLINIC | Facility: CLINIC | Age: 42
End: 2017-09-25

## 2017-09-25 NOTE — TELEPHONE ENCOUNTER
Patient had adrenal adenoma. Please let him know that hormonal SHELLEY ( metanephrines and cortisol ) normal.   He should repeat MRI of th adrenals ( he has order) in the next few months. I will see him in 10-11 months. Thanks.

## 2017-09-27 NOTE — TELEPHONE ENCOUNTER
Giveit100. Spoke with his Mother. Signed VIET in chart to speak with his mother. Informed her of Dr. Rivera Manner message below. She verbalized her understanding and had no further questions at this time.

## 2017-10-02 ENCOUNTER — HOSPITAL ENCOUNTER (OUTPATIENT)
Dept: MRI IMAGING | Age: 42
Discharge: HOME | End: 2017-10-02
Attending: INTERNAL MEDICINE
Payer: COMMERCIAL

## 2017-10-02 DIAGNOSIS — E27.8 ADRENAL NODULE (HCC): ICD-10-CM

## 2017-10-02 PROCEDURE — 74181 MRI ABDOMEN W/O CONTRAST: CPT | Performed by: INTERNAL MEDICINE

## 2017-10-10 ENCOUNTER — TELEPHONE (OUTPATIENT)
Dept: ENDOCRINOLOGY CLINIC | Facility: CLINIC | Age: 42
End: 2017-10-10

## 2017-10-11 ENCOUNTER — TELEPHONE (OUTPATIENT)
Dept: ENDOCRINOLOGY CLINIC | Facility: CLINIC | Age: 42
End: 2017-10-11

## 2017-10-11 DIAGNOSIS — E27.8 ADRENAL MASS (HCC): Primary | ICD-10-CM

## 2017-10-11 NOTE — TELEPHONE ENCOUNTER
Pts mother called to get CPT code for DHEAS lab so she can provide it to [facility] to verify coverage. Please call.

## 2017-10-11 NOTE — TELEPHONE ENCOUNTER
Spoke with Jigar Sandoval and his mother on the phone. Informed them of Dr. Abbe Lyman instructions below. They wrote down all instructions. Lab order is in chart. Mailed MRI order to the patient today.  His mother stated she will call the insurance to make sure th

## 2017-10-12 ENCOUNTER — APPOINTMENT (OUTPATIENT)
Dept: LAB | Age: 42
End: 2017-10-12
Attending: INTERNAL MEDICINE
Payer: COMMERCIAL

## 2017-10-12 DIAGNOSIS — E27.8 ADRENAL MASS (HCC): ICD-10-CM

## 2017-10-12 PROCEDURE — 36415 COLL VENOUS BLD VENIPUNCTURE: CPT

## 2017-10-12 PROCEDURE — 82627 DEHYDROEPIANDROSTERONE: CPT

## 2017-10-13 ENCOUNTER — TELEPHONE (OUTPATIENT)
Dept: ENDOCRINOLOGY CLINIC | Facility: CLINIC | Age: 42
End: 2017-10-13

## 2017-10-13 NOTE — TELEPHONE ENCOUNTER
Spoke with Brain and discussed note below from AM. Pt verbalized understanding. Pt states he will schedule MRI in January 2018.

## 2023-02-20 NOTE — MR AVS SNAPSHOT
Jessa from [address] [place] [address] called in regarding patient's colonoscopy and EGD on May 15 asking if it's okay to hold plavix for 5 days.  Please call Peter Brasher at [phone] [address]  [address] [facility]  [phone]               Thank you for choosing us for your health care visit with Nga Hankins MD.  We are glad to serve you and happy to provide you with this s Return if symptoms worsen or fail to improve.          Referral Orders      Normal Orders This Visit    ENT - INTERNAL [[id number] CUSTOM]  Order #:  [id number]         **REFERRAL REQUEST**    Your physician has referred you to a specialist.  Your physician o Fully enjoy your food when eating. Don’t eat while distracted and slow down. Avoid over sized portions. Don’t eat while when you’re bored.      EAT THESE FOODS MORE OFTEN: EAT THESE FOODS LESS OFTEN:   Make half your plate fruits and vegetables Highly

## 2024-04-08 NOTE — TELEPHONE ENCOUNTER
Called and spoke with patient to convey results and recommendations.  Patient verbalizes understanding and agrees with plan.  No further questions at this time. He will call our office if any new or worsening symptoms arise.      I had discussed MRI findings of adrenal mass with the patient yesterday. Told him that I will be ordering MRI and DHEAS ( blood test). Orders are in. Blood test: to be done now, before 10 am, no need to fast  MRI in 3-6 months, please mail order.    Jigar Diaz

## (undated) DEVICE — COVER SGL STRL LGHT HNDL BLU

## (undated) DEVICE — SUTURE CHROMIC GUT 3-0 SH

## (undated) DEVICE — HEAD & NECK: Brand: MEDLINE INDUSTRIES, INC.

## (undated) DEVICE — SUTURE SILK 2-0 685H

## (undated) DEVICE — DRAIN RESERVOIR RELIAVAC 100CC

## (undated) DEVICE — SPONGE LAP 18X18 XRAY STRL

## (undated) DEVICE — CHLORAPREP 26ML APPLICATOR

## (undated) DEVICE — REM POLYHESIVE ADULT PATIENT RETURN ELECTRODE: Brand: VALLEYLAB

## (undated) DEVICE — SOL  .9 1000ML BTL

## (undated) DEVICE — KENDALL SCD EXPRESS SLEEVES, KNEE LENGTH, MEDIUM: Brand: KENDALL SCD

## (undated) DEVICE — NECK ACCESSORY

## (undated) DEVICE — DRAIN INCS 7MM 20CMX7MM SIL

## (undated) DEVICE — STERILE LATEX POWDER-FREE SURGICAL GLOVESWITH NITRILE COATING: Brand: PROTEXIS

## (undated) DEVICE — SUCTION CANISTER, 3000CC,SAFELINER: Brand: DEROYAL

## (undated) DEVICE — SUTURE SILK 2-0 SA65H

## (undated) DEVICE — SUTURE PROLENE 4-0 PS-2

## (undated) NOTE — Clinical Note
Thank you for the consult. I saw  Mr. Tere Rollins in the endocrine/diabetes clinic today. Please see attached my note. Please feel free to contact me with any questions. Thanks!

## (undated) NOTE — Clinical Note
Yesenia Nguyen, [address], [address] [facility]       02/17/2017        Patient: Jacobo Watkins   YOB: 1975   Date of Visit: 2/17/2017       Dear  Dr. Junior Carr MD,      Thank you for referring Jacobo Roland

## (undated) NOTE — MR AVS SNAPSHOT
[address]  [phone]               Thank you for choosing us for your health care visit with Hari Beltrán MD.  We are glad to serve you and happy to provide you with this summar Enter your [employer] Activation Code exactly as it appears below along with your Zip Code and Date of Birth to complete the sign-up process. If you do not sign up before the expiration date, you must request a new code.     Your unique [employer] Access Code: Randa Goldberg

## (undated) NOTE — MR AVS SNAPSHOT
[address]  [phone]               Thank you for choosing us for your health care visit with Hari Euceda MD.  We are glad to serve you and happy to provide you with this summar Your unique [employer] Access Code: [email or web address]  Expires: 4/11/2017 12:22 PM    If you have questions, you can call [phone] to talk to our [facility] Staff. Remember, [employer] is NOT to be used for urgent needs. For medical emergencies, dial 911.

## (undated) NOTE — ED AVS SNAPSHOT
Barry Torres   MRN: [id number]    Department:  [facility] Emergency Department   Date of Visit:  8/24/2017           Disclosure     Insurance plans vary and the physician(s) referred by the ER may not be covered by your plan.  Please contact you CARE PHYSICIAN AT ONCE OR RETURN IMMEDIATELY TO THE EMERGENCY DEPARTMENT. If you have been prescribed any medication(s), please fill your prescription right away and begin taking the medication(s) as directed.   If you believe that any of the medications

## (undated) NOTE — LETTER
8/30/2017              [place] [id number]         Dear Praveen Moss,    This letter is to inform you that our office has made several attempts to reach you by phone without success.   We were attempting to contact y

## (undated) NOTE — MR AVS SNAPSHOT
[address]  [phone]               Thank you for choosing us for your health care visit with Hari Terrazas MD.  We are glad to serve you and happy to provide you with this summar all metal near the part of your body that will be scanned, including jewelry, eyeglasses, and dentures.  Women may need to remove any bra that has metal underwire.   Tell the technologist   Be sure to tell the technologist if:  · You have allergies or kidne CT SOFT TISSUE OF NECK (W+WO) (CPT=70492)    Complete by:  Feb 17, 2017 (Approximate)    Assoc Dx:  Neck mass [R22.1]                 Follow-up Instructions     Return in about 1 week (around 2/24/2017).       Scheduling Instructions     Friday February 17 If you have questions, you can call [phone] to talk to our [facility] Staff. Remember, [employer] is NOT to be used for urgent needs. For medical emergencies, dial 911.            Visit [facility] online at  [email or web address]

## (undated) NOTE — IP AVS SNAPSHOT
[facility]    [address], [place], [place] ~ [phone]                Discharge Summary   4/3/2017    Yari Ly           Admission Information        Provider Department    4/3/2017 Alden Cranker.  Kourtney Moran MD [facility] Pre-Op Rec · If you had a nerve block for your surgery, take extra care not to put any pressure on your arm or hand for 24 hours    It is normal:  · For you to have a sore throat if you had a breathing tube during surgery (while you were asleep!).  The sore throat ana rosa stamped envelope. The questionnaire should take no longer than a few minutes to complete and your answers are private. Your health and feedback are important to us!     If you receive a NSQIP questionnaire, and have questions please contact:   Ailyn Perez visit,  view other health information, and more. To sign up or find more information, go to [email or web address] and click on the Sign Up Now link in the [employer] box.      Enter your [employer] Activation Code exactly as it appears below along with yo

## (undated) NOTE — MR AVS SNAPSHOT
[address]  [phone]               Thank you for choosing us for your health care visit with Hari Reid MD.  We are glad to serve you and happy to provide you with this summar Sign up for [employer], your secure online medical record. [employer] will allow you to access patient instructions from your recent visit,  view other health information, and more. To sign up or find more information, go to [email or web address] and cl